# Patient Record
Sex: FEMALE | Race: WHITE | Employment: UNEMPLOYED | URBAN - METROPOLITAN AREA
[De-identification: names, ages, dates, MRNs, and addresses within clinical notes are randomized per-mention and may not be internally consistent; named-entity substitution may affect disease eponyms.]

---

## 2024-05-13 ENCOUNTER — OFFICE VISIT (OUTPATIENT)
Dept: INTERNAL MEDICINE CLINIC | Age: 19
End: 2024-05-13

## 2024-05-13 ENCOUNTER — HOSPITAL ENCOUNTER (OUTPATIENT)
Age: 19
Discharge: HOME | End: 2024-05-13

## 2024-05-13 VITALS
SYSTOLIC BLOOD PRESSURE: 111 MMHG | DIASTOLIC BLOOD PRESSURE: 66 MMHG | WEIGHT: 120 LBS | BODY MASS INDEX: 17.77 KG/M2 | HEIGHT: 69 IN

## 2024-05-13 DIAGNOSIS — Z11.1 TUBERCULOSIS SCREENING: Primary | ICD-10-CM

## 2024-05-13 DIAGNOSIS — Z11.1 TUBERCULOSIS SCREENING: ICD-10-CM

## 2024-05-13 PROCEDURE — 99999 PR OFFICE/OUTPT VISIT,PROCEDURE ONLY: CPT | Performed by: INTERNAL MEDICINE

## 2024-05-13 PROCEDURE — 86480 TB TEST CELL IMMUN MEASURE: CPT

## 2024-05-16 LAB
GAMMA INTERFERON BACKGROUND BLD IA-ACNC: 0.03 IU/ML
MITOGEN IGNF BCKGRD COR BLD-ACNC: >10 IU/ML
QUANTI TB GOLD PLUS: NEGATIVE
QUANTI TB1 MINUS NIL: 0.01 IU/ML (ref 0–0.34)
QUANTI TB2 MINUS NIL: 0.02 IU/ML (ref 0–0.34)

## 2024-08-22 ENCOUNTER — TELEPHONE (OUTPATIENT)
Dept: INTERNAL MEDICINE CLINIC | Age: 19
End: 2024-08-22

## 2024-08-22 NOTE — TELEPHONE ENCOUNTER
Went to see a therapist and she is recommending Zoloft for her depression and Anxiety. She is wanting to try this.     Leaving for school tomorrow.     GERARDO

## 2024-10-11 ENCOUNTER — OFFICE VISIT (OUTPATIENT)
Dept: INTERNAL MEDICINE CLINIC | Age: 19
End: 2024-10-11
Payer: COMMERCIAL

## 2024-10-11 VITALS
OXYGEN SATURATION: 95 % | TEMPERATURE: 98.1 F | DIASTOLIC BLOOD PRESSURE: 61 MMHG | BODY MASS INDEX: 17.97 KG/M2 | RESPIRATION RATE: 20 BRPM | HEIGHT: 69 IN | HEART RATE: 76 BPM | SYSTOLIC BLOOD PRESSURE: 102 MMHG | WEIGHT: 121.3 LBS

## 2024-10-11 DIAGNOSIS — Z00.00 ROUTINE CHECK-UP: Primary | ICD-10-CM

## 2024-10-11 DIAGNOSIS — Z00.00 ROUTINE CHECK-UP: ICD-10-CM

## 2024-10-11 LAB
ALBUMIN SERPL-MCNC: 4.6 G/DL (ref 3.4–5)
ALBUMIN/GLOB SERPL: 2.1 {RATIO} (ref 1.1–2.2)
ALP SERPL-CCNC: 80 U/L (ref 40–129)
ALT SERPL-CCNC: 14 U/L (ref 10–40)
ANION GAP SERPL CALCULATED.3IONS-SCNC: 9 MMOL/L (ref 3–16)
AST SERPL-CCNC: 16 U/L (ref 15–37)
BASOPHILS # BLD: 0 K/UL (ref 0–0.2)
BASOPHILS NFR BLD: 0.5 %
BILIRUB SERPL-MCNC: 0.7 MG/DL (ref 0–1)
BUN SERPL-MCNC: 12 MG/DL (ref 7–20)
CALCIUM SERPL-MCNC: 9.6 MG/DL (ref 8.3–10.6)
CHLORIDE SERPL-SCNC: 105 MMOL/L (ref 99–110)
CO2 SERPL-SCNC: 27 MMOL/L (ref 21–32)
CREAT SERPL-MCNC: 0.7 MG/DL (ref 0.6–1.1)
DEPRECATED RDW RBC AUTO: 13.7 % (ref 12.4–15.4)
EOSINOPHIL # BLD: 0.1 K/UL (ref 0–0.6)
EOSINOPHIL NFR BLD: 1.2 %
FOLATE SERPL-MCNC: 11.3 NG/ML (ref 4.78–24.2)
GFR SERPLBLD CREATININE-BSD FMLA CKD-EPI: >90 ML/MIN/{1.73_M2}
GLUCOSE SERPL-MCNC: 79 MG/DL (ref 70–99)
HCT VFR BLD AUTO: 38.2 % (ref 36–48)
HGB BLD-MCNC: 12.6 G/DL (ref 12–16)
IRON SATN MFR SERPL: 10 % (ref 15–50)
IRON SERPL-MCNC: 31 UG/DL (ref 37–145)
LYMPHOCYTES # BLD: 1.5 K/UL (ref 1–5.1)
LYMPHOCYTES NFR BLD: 18.8 %
MCH RBC QN AUTO: 28.7 PG (ref 26–34)
MCHC RBC AUTO-ENTMCNC: 33.1 G/DL (ref 31–36)
MCV RBC AUTO: 86.7 FL (ref 80–100)
MONOCYTES # BLD: 0.4 K/UL (ref 0–1.3)
MONOCYTES NFR BLD: 5.2 %
NEUTROPHILS # BLD: 5.9 K/UL (ref 1.7–7.7)
NEUTROPHILS NFR BLD: 74.3 %
PLATELET # BLD AUTO: 231 K/UL (ref 135–450)
PMV BLD AUTO: 9.9 FL (ref 5–10.5)
POTASSIUM SERPL-SCNC: 4.3 MMOL/L (ref 3.5–5.1)
PROT SERPL-MCNC: 6.8 G/DL (ref 6.4–8.2)
RBC # BLD AUTO: 4.4 M/UL (ref 4–5.2)
SODIUM SERPL-SCNC: 141 MMOL/L (ref 136–145)
TIBC SERPL-MCNC: 319 UG/DL (ref 260–445)
TSH SERPL DL<=0.005 MIU/L-ACNC: 1.02 UIU/ML (ref 0.27–4.2)
VIT B12 SERPL-MCNC: 653 PG/ML (ref 211–911)
WBC # BLD AUTO: 7.9 K/UL (ref 4–11)

## 2024-10-11 PROCEDURE — 99213 OFFICE O/P EST LOW 20 MIN: CPT

## 2024-10-11 ASSESSMENT — ENCOUNTER SYMPTOMS
APNEA: 0
DIARRHEA: 0
TROUBLE SWALLOWING: 0
SINUS PAIN: 1
NAUSEA: 0
BACK PAIN: 0
SINUS PRESSURE: 0
SHORTNESS OF BREATH: 0
COUGH: 0
ABDOMINAL PAIN: 0
CHEST TIGHTNESS: 0
WHEEZING: 0
ABDOMINAL DISTENTION: 0
VOMITING: 0

## 2024-10-11 ASSESSMENT — PATIENT HEALTH QUESTIONNAIRE - PHQ9
SUM OF ALL RESPONSES TO PHQ QUESTIONS 1-9: 0
SUM OF ALL RESPONSES TO PHQ9 QUESTIONS 1 & 2: 0
1. LITTLE INTEREST OR PLEASURE IN DOING THINGS: NOT AT ALL
SUM OF ALL RESPONSES TO PHQ QUESTIONS 1-9: 0
2. FEELING DOWN, DEPRESSED OR HOPELESS: NOT AT ALL
SUM OF ALL RESPONSES TO PHQ QUESTIONS 1-9: 0
SUM OF ALL RESPONSES TO PHQ QUESTIONS 1-9: 0

## 2024-10-11 NOTE — PROGRESS NOTES
[facility]    Lima Soria is a 19 y.o. female, here for routine wellness evaluation.    Patient states she has been in her normal state of health, however, has began to develop tension type headaches since starting her fall semester at [employer].  She is unable to identify any precipitating factors, but reports that they mostly occur while studying for long periods of time.  She states that she has decreased concentration with her headaches and they are relieved when she takes a break from studying.  She denies sleep disturbances and has a negative depression screen.     Review of Systems   Constitutional:  Negative for activity change, appetite change, chills, diaphoresis, fatigue and fever.   HENT:  Positive for nosebleeds and sinus pain. Negative for sinus pressure, sneezing and trouble swallowing.    Eyes:         Photophobia after looking at computer screens for extended period of time.  Relieved with blue lens glasses and frequent breaks.   Respiratory:  Negative for apnea, cough, chest tightness, shortness of breath and wheezing.    Cardiovascular:  Negative for chest pain, palpitations and leg swelling.   Gastrointestinal:  Negative for abdominal distention, abdominal pain, diarrhea, nausea and vomiting.   Genitourinary:  Negative for difficulty urinating, dysuria, enuresis, hematuria, menstrual problem, vaginal discharge and vaginal pain.   Musculoskeletal:  Negative for back pain, neck pain and neck stiffness.   Neurological:  Negative for dizziness, syncope, weakness, light-headedness, numbness and headaches.   Psychiatric/Behavioral:  Negative for confusion, sleep disturbance and suicidal ideas. The patient is not nervous/anxious.        MEDICATIONS:  Prior to Visit Medications    Not on File        Vitals:    10/11/24 0942   BP: 102/61   Site: Left Upper Arm   Position: Sitting   Cuff Size: Small Adult   Pulse: 76   Resp: 20   Temp: 98.1 °F

## 2024-10-11 NOTE — PATIENT INSTRUCTIONS
Thank you for visiting [facility].  Today we discussed your chronic headaches and new difficulties with attention span.    Given our discussion today I would like for you to have labs completed including:   Iron and TIBC   Thyroid studies   Complete Metabolic Panel (this includes your liver function studies)   Complete Blood Count   B12 and Folate    This is to evaluate for vitamin deficiencies as well as recurrence of anemia.  I believe overall you are doing very well and that the root cause of your headaches may be elucidated from the aforementioned tests.      We are happy to see you again as needed and will call with your lab results when available.  Thank you again for visiting today!

## 2025-08-22 ENCOUNTER — OFFICE VISIT (OUTPATIENT)
Dept: INTERNAL MEDICINE CLINIC | Age: 20
End: 2025-08-22
Payer: COMMERCIAL

## 2025-08-22 VITALS
SYSTOLIC BLOOD PRESSURE: 105 MMHG | WEIGHT: 126.4 LBS | BODY MASS INDEX: 18.72 KG/M2 | HEIGHT: 69 IN | OXYGEN SATURATION: 99 % | DIASTOLIC BLOOD PRESSURE: 71 MMHG | HEART RATE: 77 BPM

## 2025-08-22 DIAGNOSIS — H93.13 TINNITUS AURIUM, BILATERAL: Primary | ICD-10-CM

## 2025-08-22 PROCEDURE — 99213 OFFICE O/P EST LOW 20 MIN: CPT

## 2025-08-22 RX ORDER — FLUTICASONE PROPIONATE 50 MCG
2 SPRAY, SUSPENSION (ML) NASAL DAILY
Qty: 16 G | Refills: 0 | Status: SHIPPED | OUTPATIENT
Start: 2025-08-22

## 2025-08-22 SDOH — ECONOMIC STABILITY: FOOD INSECURITY: WITHIN THE PAST 12 MONTHS, THE FOOD YOU BOUGHT JUST DIDN'T LAST AND YOU DIDN'T HAVE MONEY TO GET MORE.: NEVER TRUE

## 2025-08-22 SDOH — ECONOMIC STABILITY: FOOD INSECURITY: WITHIN THE PAST 12 MONTHS, YOU WORRIED THAT YOUR FOOD WOULD RUN OUT BEFORE YOU GOT MONEY TO BUY MORE.: NEVER TRUE

## 2025-08-22 ASSESSMENT — ENCOUNTER SYMPTOMS
EYES NEGATIVE: 1
ALLERGIC/IMMUNOLOGIC NEGATIVE: 1
GASTROINTESTINAL NEGATIVE: 1
RHINORRHEA: 0
VOICE CHANGE: 0
RESPIRATORY NEGATIVE: 1
SORE THROAT: 0

## 2025-08-22 ASSESSMENT — PATIENT HEALTH QUESTIONNAIRE - PHQ9
SUM OF ALL RESPONSES TO PHQ QUESTIONS 1-9: 0
1. LITTLE INTEREST OR PLEASURE IN DOING THINGS: NOT AT ALL
SUM OF ALL RESPONSES TO PHQ QUESTIONS 1-9: 0
2. FEELING DOWN, DEPRESSED OR HOPELESS: NOT AT ALL
SUM OF ALL RESPONSES TO PHQ QUESTIONS 1-9: 0
SUM OF ALL RESPONSES TO PHQ QUESTIONS 1-9: 0